# Patient Record
Sex: FEMALE | Race: WHITE | Employment: OTHER | URBAN - METROPOLITAN AREA
[De-identification: names, ages, dates, MRNs, and addresses within clinical notes are randomized per-mention and may not be internally consistent; named-entity substitution may affect disease eponyms.]

---

## 2017-01-03 ENCOUNTER — CARE COORDINATION (OUTPATIENT)
Dept: CARE COORDINATION | Age: 77
End: 2017-01-03

## 2017-01-31 ENCOUNTER — CARE COORDINATION (OUTPATIENT)
Dept: PRIMARY CARE CLINIC | Age: 77
End: 2017-01-31

## 2017-02-08 ENCOUNTER — CARE COORDINATOR VISIT (OUTPATIENT)
Dept: PRIMARY CARE CLINIC | Age: 77
End: 2017-02-08

## 2017-02-08 ENCOUNTER — OFFICE VISIT (OUTPATIENT)
Dept: PRIMARY CARE CLINIC | Age: 77
End: 2017-02-08

## 2017-02-08 VITALS
RESPIRATION RATE: 14 BRPM | HEIGHT: 64 IN | BODY MASS INDEX: 18.78 KG/M2 | DIASTOLIC BLOOD PRESSURE: 84 MMHG | SYSTOLIC BLOOD PRESSURE: 136 MMHG | WEIGHT: 110 LBS | TEMPERATURE: 97.3 F | HEART RATE: 60 BPM

## 2017-02-08 DIAGNOSIS — R63.4 UNINTENDED WEIGHT LOSS: Primary | ICD-10-CM

## 2017-02-08 DIAGNOSIS — C50.919 MALIGNANT NEOPLASM OF FEMALE BREAST, UNSPECIFIED LATERALITY, UNSPECIFIED SITE OF BREAST: ICD-10-CM

## 2017-02-08 DIAGNOSIS — Z72.0 TOBACCO ABUSE: ICD-10-CM

## 2017-02-08 DIAGNOSIS — J44.9 CHRONIC OBSTRUCTIVE PULMONARY DISEASE, UNSPECIFIED COPD TYPE (HCC): ICD-10-CM

## 2017-02-08 DIAGNOSIS — J44.9 OBSTRUCTIVE CHRONIC BRONCHITIS WITHOUT EXACERBATION (HCC): ICD-10-CM

## 2017-02-08 DIAGNOSIS — M89.8X9 BONE PAIN: ICD-10-CM

## 2017-02-08 DIAGNOSIS — C50.911 MALIGNANT NEOPLASM OF RIGHT FEMALE BREAST, UNSPECIFIED SITE OF BREAST: ICD-10-CM

## 2017-02-08 DIAGNOSIS — R51.9 HEADACHE, UNSPECIFIED HEADACHE TYPE: ICD-10-CM

## 2017-02-08 DIAGNOSIS — Z12.31 ENCOUNTER FOR SCREENING MAMMOGRAM FOR BREAST CANCER: ICD-10-CM

## 2017-02-08 LAB
ALBUMIN SERPL-MCNC: 4.5 G/DL (ref 3.9–4.9)
ALP BLD-CCNC: 78 U/L (ref 40–130)
ALT SERPL-CCNC: 25 U/L (ref 0–33)
ANION GAP SERPL CALCULATED.3IONS-SCNC: 11 MEQ/L (ref 7–13)
AST SERPL-CCNC: 27 U/L (ref 0–35)
BASOPHILS ABSOLUTE: 0.1 K/UL (ref 0–0.2)
BASOPHILS RELATIVE PERCENT: 0.9 %
BILIRUB SERPL-MCNC: 0.6 MG/DL (ref 0–1.2)
BUN BLDV-MCNC: 21 MG/DL (ref 8–23)
CALCIUM SERPL-MCNC: 10.4 MG/DL (ref 8.6–10.2)
CHLORIDE BLD-SCNC: 101 MEQ/L (ref 98–107)
CO2: 27 MEQ/L (ref 22–29)
CREAT SERPL-MCNC: 0.65 MG/DL (ref 0.5–0.9)
EOSINOPHILS ABSOLUTE: 0.1 K/UL (ref 0–0.7)
EOSINOPHILS RELATIVE PERCENT: 1.9 %
GFR AFRICAN AMERICAN: >60
GFR NON-AFRICAN AMERICAN: >60
GLOBULIN: 2.5 G/DL (ref 2.3–3.5)
GLUCOSE BLD-MCNC: 91 MG/DL (ref 74–109)
HCT VFR BLD CALC: 45.2 % (ref 37–47)
HEMOGLOBIN: 15.3 G/DL (ref 12–16)
LYMPHOCYTES ABSOLUTE: 2.7 K/UL (ref 1–4.8)
LYMPHOCYTES RELATIVE PERCENT: 34.7 %
MCH RBC QN AUTO: 32.1 PG (ref 27–31.3)
MCHC RBC AUTO-ENTMCNC: 33.9 % (ref 33–37)
MCV RBC AUTO: 94.6 FL (ref 82–100)
MONOCYTES ABSOLUTE: 0.6 K/UL (ref 0.2–0.8)
MONOCYTES RELATIVE PERCENT: 8 %
NEUTROPHILS ABSOLUTE: 4.2 K/UL (ref 1.4–6.5)
NEUTROPHILS RELATIVE PERCENT: 54.5 %
PDW BLD-RTO: 14.5 % (ref 11.5–14.5)
PLATELET # BLD: 172 K/UL (ref 130–400)
POTASSIUM SERPL-SCNC: 4.5 MEQ/L (ref 3.5–5.1)
RBC # BLD: 4.78 M/UL (ref 4.2–5.4)
SODIUM BLD-SCNC: 139 MEQ/L (ref 132–144)
TOTAL PROTEIN: 7 G/DL (ref 6.4–8.1)
WBC # BLD: 7.7 K/UL (ref 4.8–10.8)

## 2017-02-08 PROCEDURE — G8598 ASA/ANTIPLAT THER USED: HCPCS | Performed by: FAMILY MEDICINE

## 2017-02-08 PROCEDURE — G8484 FLU IMMUNIZE NO ADMIN: HCPCS | Performed by: FAMILY MEDICINE

## 2017-02-08 PROCEDURE — 3023F SPIROM DOC REV: CPT | Performed by: FAMILY MEDICINE

## 2017-02-08 PROCEDURE — 1090F PRES/ABSN URINE INCON ASSESS: CPT | Performed by: FAMILY MEDICINE

## 2017-02-08 PROCEDURE — G8427 DOCREV CUR MEDS BY ELIG CLIN: HCPCS | Performed by: FAMILY MEDICINE

## 2017-02-08 PROCEDURE — 99214 OFFICE O/P EST MOD 30 MIN: CPT | Performed by: FAMILY MEDICINE

## 2017-02-08 PROCEDURE — 4004F PT TOBACCO SCREEN RCVD TLK: CPT | Performed by: FAMILY MEDICINE

## 2017-02-08 PROCEDURE — G8400 PT W/DXA NO RESULTS DOC: HCPCS | Performed by: FAMILY MEDICINE

## 2017-02-08 PROCEDURE — 4040F PNEUMOC VAC/ADMIN/RCVD: CPT | Performed by: FAMILY MEDICINE

## 2017-02-08 PROCEDURE — G8926 SPIRO NO PERF OR DOC: HCPCS | Performed by: FAMILY MEDICINE

## 2017-02-08 PROCEDURE — 1123F ACP DISCUSS/DSCN MKR DOCD: CPT | Performed by: FAMILY MEDICINE

## 2017-02-08 PROCEDURE — G8419 CALC BMI OUT NRM PARAM NOF/U: HCPCS | Performed by: FAMILY MEDICINE

## 2017-02-08 ASSESSMENT — ENCOUNTER SYMPTOMS: BACK PAIN: 1

## 2017-02-09 DIAGNOSIS — R60.9 EDEMA, UNSPECIFIED TYPE: Primary | ICD-10-CM

## 2017-02-09 DIAGNOSIS — M79.602 PAIN OF LEFT UPPER EXTREMITY: ICD-10-CM

## 2017-02-09 DIAGNOSIS — R93.89 IRREGULAR RADIOLOGIC DENSITY: ICD-10-CM

## 2017-02-13 DIAGNOSIS — M79.602 PAIN OF LEFT UPPER EXTREMITY: ICD-10-CM

## 2017-02-13 DIAGNOSIS — R60.9 EDEMA, UNSPECIFIED TYPE: ICD-10-CM

## 2017-02-15 ENCOUNTER — CARE COORDINATOR VISIT (OUTPATIENT)
Dept: PRIMARY CARE CLINIC | Age: 77
End: 2017-02-15

## 2017-02-15 ENCOUNTER — OFFICE VISIT (OUTPATIENT)
Dept: PRIMARY CARE CLINIC | Age: 77
End: 2017-02-15

## 2017-02-15 VITALS
TEMPERATURE: 97.2 F | RESPIRATION RATE: 14 BRPM | HEIGHT: 64 IN | BODY MASS INDEX: 18.61 KG/M2 | HEART RATE: 60 BPM | DIASTOLIC BLOOD PRESSURE: 62 MMHG | WEIGHT: 109 LBS | SYSTOLIC BLOOD PRESSURE: 122 MMHG

## 2017-02-15 DIAGNOSIS — C50.919 MALIGNANT NEOPLASM OF FEMALE BREAST, UNSPECIFIED LATERALITY, UNSPECIFIED SITE OF BREAST: ICD-10-CM

## 2017-02-15 DIAGNOSIS — J44.9 CHRONIC OBSTRUCTIVE PULMONARY DISEASE, UNSPECIFIED COPD TYPE (HCC): Primary | ICD-10-CM

## 2017-02-15 DIAGNOSIS — R63.4 WEIGHT LOSS, UNINTENTIONAL: ICD-10-CM

## 2017-02-15 PROCEDURE — G8400 PT W/DXA NO RESULTS DOC: HCPCS | Performed by: FAMILY MEDICINE

## 2017-02-15 PROCEDURE — G8484 FLU IMMUNIZE NO ADMIN: HCPCS | Performed by: FAMILY MEDICINE

## 2017-02-15 PROCEDURE — 99213 OFFICE O/P EST LOW 20 MIN: CPT | Performed by: FAMILY MEDICINE

## 2017-02-15 PROCEDURE — G8427 DOCREV CUR MEDS BY ELIG CLIN: HCPCS | Performed by: FAMILY MEDICINE

## 2017-02-15 PROCEDURE — G8419 CALC BMI OUT NRM PARAM NOF/U: HCPCS | Performed by: FAMILY MEDICINE

## 2017-02-15 PROCEDURE — 3023F SPIROM DOC REV: CPT | Performed by: FAMILY MEDICINE

## 2017-02-15 PROCEDURE — 4004F PT TOBACCO SCREEN RCVD TLK: CPT | Performed by: FAMILY MEDICINE

## 2017-02-15 PROCEDURE — 1123F ACP DISCUSS/DSCN MKR DOCD: CPT | Performed by: FAMILY MEDICINE

## 2017-02-15 PROCEDURE — G8926 SPIRO NO PERF OR DOC: HCPCS | Performed by: FAMILY MEDICINE

## 2017-02-15 PROCEDURE — 1090F PRES/ABSN URINE INCON ASSESS: CPT | Performed by: FAMILY MEDICINE

## 2017-02-15 PROCEDURE — G8598 ASA/ANTIPLAT THER USED: HCPCS | Performed by: FAMILY MEDICINE

## 2017-02-15 PROCEDURE — 4040F PNEUMOC VAC/ADMIN/RCVD: CPT | Performed by: FAMILY MEDICINE

## 2017-02-15 ASSESSMENT — ENCOUNTER SYMPTOMS
CHOKING: 0
APNEA: 0
STRIDOR: 0
DIARRHEA: 0
EYE PAIN: 0
ABDOMINAL PAIN: 1
EYE DISCHARGE: 0
NAUSEA: 0
CHEST TIGHTNESS: 0
CONSTIPATION: 0
EYE REDNESS: 0
WHEEZING: 0
FACIAL SWELLING: 0
COLOR CHANGE: 0
PHOTOPHOBIA: 0

## 2017-03-08 ENCOUNTER — OFFICE VISIT (OUTPATIENT)
Dept: PRIMARY CARE CLINIC | Age: 77
End: 2017-03-08

## 2017-03-08 VITALS
HEART RATE: 64 BPM | RESPIRATION RATE: 12 BRPM | SYSTOLIC BLOOD PRESSURE: 104 MMHG | HEIGHT: 64 IN | BODY MASS INDEX: 18.82 KG/M2 | DIASTOLIC BLOOD PRESSURE: 60 MMHG | WEIGHT: 110.2 LBS | TEMPERATURE: 97 F

## 2017-03-08 DIAGNOSIS — R63.0 LOSS OF APPETITE: ICD-10-CM

## 2017-03-08 DIAGNOSIS — Z72.0 TOBACCO ABUSE: ICD-10-CM

## 2017-03-08 DIAGNOSIS — C50.919 MALIGNANT NEOPLASM OF FEMALE BREAST, UNSPECIFIED LATERALITY, UNSPECIFIED SITE OF BREAST: ICD-10-CM

## 2017-03-08 DIAGNOSIS — R63.4 UNINTENDED WEIGHT LOSS: Primary | ICD-10-CM

## 2017-03-08 PROCEDURE — 4040F PNEUMOC VAC/ADMIN/RCVD: CPT | Performed by: FAMILY MEDICINE

## 2017-03-08 PROCEDURE — G0444 DEPRESSION SCREEN ANNUAL: HCPCS | Performed by: FAMILY MEDICINE

## 2017-03-08 PROCEDURE — 99213 OFFICE O/P EST LOW 20 MIN: CPT | Performed by: FAMILY MEDICINE

## 2017-03-08 PROCEDURE — G8484 FLU IMMUNIZE NO ADMIN: HCPCS | Performed by: FAMILY MEDICINE

## 2017-03-08 PROCEDURE — G8400 PT W/DXA NO RESULTS DOC: HCPCS | Performed by: FAMILY MEDICINE

## 2017-03-08 PROCEDURE — G8598 ASA/ANTIPLAT THER USED: HCPCS | Performed by: FAMILY MEDICINE

## 2017-03-08 PROCEDURE — 1090F PRES/ABSN URINE INCON ASSESS: CPT | Performed by: FAMILY MEDICINE

## 2017-03-08 PROCEDURE — 4004F PT TOBACCO SCREEN RCVD TLK: CPT | Performed by: FAMILY MEDICINE

## 2017-03-08 PROCEDURE — 1123F ACP DISCUSS/DSCN MKR DOCD: CPT | Performed by: FAMILY MEDICINE

## 2017-03-08 PROCEDURE — G8419 CALC BMI OUT NRM PARAM NOF/U: HCPCS | Performed by: FAMILY MEDICINE

## 2017-03-08 PROCEDURE — G8427 DOCREV CUR MEDS BY ELIG CLIN: HCPCS | Performed by: FAMILY MEDICINE

## 2017-03-08 RX ORDER — MEGESTROL ACETATE 40 MG/1
40 TABLET ORAL DAILY
Qty: 30 TABLET | Refills: 3 | Status: SHIPPED | OUTPATIENT
Start: 2017-03-08 | End: 2017-07-13

## 2017-03-08 ASSESSMENT — PATIENT HEALTH QUESTIONNAIRE - PHQ9
SUM OF ALL RESPONSES TO PHQ9 QUESTIONS 1 & 2: 5
3. TROUBLE FALLING OR STAYING ASLEEP: 3
6. FEELING BAD ABOUT YOURSELF - OR THAT YOU ARE A FAILURE OR HAVE LET YOURSELF OR YOUR FAMILY DOWN: 0
7. TROUBLE CONCENTRATING ON THINGS, SUCH AS READING THE NEWSPAPER OR WATCHING TELEVISION: 0
8. MOVING OR SPEAKING SO SLOWLY THAT OTHER PEOPLE COULD HAVE NOTICED. OR THE OPPOSITE, BEING SO FIGETY OR RESTLESS THAT YOU HAVE BEEN MOVING AROUND A LOT MORE THAN USUAL: 0
1. LITTLE INTEREST OR PLEASURE IN DOING THINGS: 3
9. THOUGHTS THAT YOU WOULD BE BETTER OFF DEAD, OR OF HURTING YOURSELF: 0
5. POOR APPETITE OR OVEREATING: 1
10. IF YOU CHECKED OFF ANY PROBLEMS, HOW DIFFICULT HAVE THESE PROBLEMS MADE IT FOR YOU TO DO YOUR WORK, TAKE CARE OF THINGS AT HOME, OR GET ALONG WITH OTHER PEOPLE: 0
SUM OF ALL RESPONSES TO PHQ QUESTIONS 1-9: 12
4. FEELING TIRED OR HAVING LITTLE ENERGY: 3
2. FEELING DOWN, DEPRESSED OR HOPELESS: 2

## 2017-03-08 ASSESSMENT — ENCOUNTER SYMPTOMS: ABDOMINAL PAIN: 0

## 2017-03-15 ENCOUNTER — CARE COORDINATION (OUTPATIENT)
Dept: CARE COORDINATION | Age: 77
End: 2017-03-15

## 2017-04-20 ENCOUNTER — CARE COORDINATION (OUTPATIENT)
Dept: PRIMARY CARE CLINIC | Age: 77
End: 2017-04-20

## 2017-04-20 ENCOUNTER — TELEPHONE (OUTPATIENT)
Dept: PRIMARY CARE CLINIC | Age: 77
End: 2017-04-20

## 2017-04-20 DIAGNOSIS — E78.5 HYPERLIPIDEMIA, UNSPECIFIED HYPERLIPIDEMIA TYPE: ICD-10-CM

## 2017-04-20 DIAGNOSIS — I63.342 CEREBROVASCULAR ACCIDENT (CVA) DUE TO THROMBOSIS OF LEFT CEREBELLAR ARTERY (HCC): Primary | ICD-10-CM

## 2017-05-05 ENCOUNTER — TELEPHONE (OUTPATIENT)
Dept: PRIMARY CARE CLINIC | Age: 77
End: 2017-05-05

## 2017-05-19 ENCOUNTER — CARE COORDINATION (OUTPATIENT)
Dept: PRIMARY CARE CLINIC | Age: 77
End: 2017-05-19

## 2017-05-19 ASSESSMENT — ENCOUNTER SYMPTOMS: DYSPNEA ASSOCIATED WITH: EXERTION

## 2017-05-27 RX ORDER — HYDROCHLOROTHIAZIDE 12.5 MG/1
TABLET ORAL
Qty: 30 TABLET | Refills: 0 | Status: SHIPPED | OUTPATIENT
Start: 2017-05-27 | End: 2017-10-26 | Stop reason: SDUPTHER

## 2017-06-19 ENCOUNTER — CARE COORDINATION (OUTPATIENT)
Dept: CARE COORDINATION | Age: 77
End: 2017-06-19

## 2017-06-20 ASSESSMENT — ENCOUNTER SYMPTOMS: DYSPNEA ASSOCIATED WITH: EXERTION

## 2017-07-03 ENCOUNTER — CARE COORDINATION (OUTPATIENT)
Dept: CARE COORDINATION | Age: 77
End: 2017-07-03

## 2017-07-03 ASSESSMENT — ENCOUNTER SYMPTOMS: DYSPNEA ASSOCIATED WITH: EXERTION

## 2017-07-13 ENCOUNTER — OFFICE VISIT (OUTPATIENT)
Dept: PRIMARY CARE CLINIC | Age: 77
End: 2017-07-13

## 2017-07-13 VITALS
HEART RATE: 67 BPM | BODY MASS INDEX: 18.1 KG/M2 | WEIGHT: 106 LBS | SYSTOLIC BLOOD PRESSURE: 100 MMHG | TEMPERATURE: 97.9 F | HEIGHT: 64 IN | DIASTOLIC BLOOD PRESSURE: 70 MMHG | OXYGEN SATURATION: 99 % | RESPIRATION RATE: 17 BRPM

## 2017-07-13 DIAGNOSIS — J44.9 CHRONIC OBSTRUCTIVE PULMONARY DISEASE, UNSPECIFIED COPD TYPE (HCC): ICD-10-CM

## 2017-07-13 DIAGNOSIS — H35.30 MACULAR DEGENERATION: ICD-10-CM

## 2017-07-13 DIAGNOSIS — C50.919 MALIGNANT NEOPLASM OF FEMALE BREAST, UNSPECIFIED LATERALITY, UNSPECIFIED SITE OF BREAST: ICD-10-CM

## 2017-07-13 DIAGNOSIS — R59.0 LYMPHADENOPATHY, INGUINAL: Primary | ICD-10-CM

## 2017-07-13 DIAGNOSIS — R63.4 ABNORMAL WEIGHT LOSS: ICD-10-CM

## 2017-07-13 DIAGNOSIS — Z13.31 POSITIVE DEPRESSION SCREENING: ICD-10-CM

## 2017-07-13 PROCEDURE — 4004F PT TOBACCO SCREEN RCVD TLK: CPT | Performed by: FAMILY MEDICINE

## 2017-07-13 PROCEDURE — G8400 PT W/DXA NO RESULTS DOC: HCPCS | Performed by: FAMILY MEDICINE

## 2017-07-13 PROCEDURE — 1123F ACP DISCUSS/DSCN MKR DOCD: CPT | Performed by: FAMILY MEDICINE

## 2017-07-13 PROCEDURE — G8427 DOCREV CUR MEDS BY ELIG CLIN: HCPCS | Performed by: FAMILY MEDICINE

## 2017-07-13 PROCEDURE — 3023F SPIROM DOC REV: CPT | Performed by: FAMILY MEDICINE

## 2017-07-13 PROCEDURE — G8419 CALC BMI OUT NRM PARAM NOF/U: HCPCS | Performed by: FAMILY MEDICINE

## 2017-07-13 PROCEDURE — G8598 ASA/ANTIPLAT THER USED: HCPCS | Performed by: FAMILY MEDICINE

## 2017-07-13 PROCEDURE — 99214 OFFICE O/P EST MOD 30 MIN: CPT | Performed by: FAMILY MEDICINE

## 2017-07-13 PROCEDURE — 1090F PRES/ABSN URINE INCON ASSESS: CPT | Performed by: FAMILY MEDICINE

## 2017-07-13 PROCEDURE — 4040F PNEUMOC VAC/ADMIN/RCVD: CPT | Performed by: FAMILY MEDICINE

## 2017-07-13 PROCEDURE — G8431 POS CLIN DEPRES SCRN F/U DOC: HCPCS | Performed by: FAMILY MEDICINE

## 2017-07-13 PROCEDURE — G8926 SPIRO NO PERF OR DOC: HCPCS | Performed by: FAMILY MEDICINE

## 2017-07-13 RX ORDER — GLUCOSAM/CHONDRO/HERB 149/HYAL 750-100 MG
1 TABLET ORAL DAILY
Qty: 30 TABLET | Refills: 0 | COMMUNITY
Start: 2017-07-13

## 2017-07-13 ASSESSMENT — ENCOUNTER SYMPTOMS
ABDOMINAL DISTENTION: 0
EYE DISCHARGE: 0
EYE PAIN: 0
CHEST TIGHTNESS: 0
VOMITING: 0
DIARRHEA: 0
ALLERGIC/IMMUNOLOGIC NEGATIVE: 1
RESPIRATORY NEGATIVE: 1
EYES NEGATIVE: 1
SHORTNESS OF BREATH: 0
GASTROINTESTINAL NEGATIVE: 1

## 2017-08-01 ENCOUNTER — CARE COORDINATOR VISIT (OUTPATIENT)
Dept: CARE COORDINATION | Age: 77
End: 2017-08-01

## 2017-08-01 ENCOUNTER — OFFICE VISIT (OUTPATIENT)
Dept: PRIMARY CARE CLINIC | Age: 77
End: 2017-08-01

## 2017-08-01 VITALS
TEMPERATURE: 98.1 F | HEIGHT: 64 IN | WEIGHT: 107 LBS | DIASTOLIC BLOOD PRESSURE: 82 MMHG | SYSTOLIC BLOOD PRESSURE: 130 MMHG | BODY MASS INDEX: 18.27 KG/M2 | RESPIRATION RATE: 12 BRPM | HEART RATE: 64 BPM

## 2017-08-01 DIAGNOSIS — R59.1 LYMPHADENOPATHY: ICD-10-CM

## 2017-08-01 DIAGNOSIS — I63.342 CEREBROVASCULAR ACCIDENT (CVA) DUE TO THROMBOSIS OF LEFT CEREBELLAR ARTERY (HCC): ICD-10-CM

## 2017-08-01 DIAGNOSIS — R31.9 URINARY TRACT INFECTION WITH HEMATURIA, SITE UNSPECIFIED: ICD-10-CM

## 2017-08-01 DIAGNOSIS — N39.0 URINARY TRACT INFECTION WITH HEMATURIA, SITE UNSPECIFIED: ICD-10-CM

## 2017-08-01 DIAGNOSIS — C50.919 MALIGNANT NEOPLASM OF FEMALE BREAST, UNSPECIFIED LATERALITY, UNSPECIFIED SITE OF BREAST: ICD-10-CM

## 2017-08-01 DIAGNOSIS — T46.4X1S: ICD-10-CM

## 2017-08-01 DIAGNOSIS — M19.90 OSTEOARTHRITIS, UNSPECIFIED OSTEOARTHRITIS TYPE, UNSPECIFIED SITE: ICD-10-CM

## 2017-08-01 DIAGNOSIS — Z01.818 PRE-OPERATIVE CLEARANCE: Primary | ICD-10-CM

## 2017-08-01 DIAGNOSIS — Z01.818 PRE-OPERATIVE CLEARANCE: ICD-10-CM

## 2017-08-01 DIAGNOSIS — J44.9 CHRONIC OBSTRUCTIVE PULMONARY DISEASE, UNSPECIFIED COPD TYPE (HCC): ICD-10-CM

## 2017-08-01 DIAGNOSIS — T78.3XXS: ICD-10-CM

## 2017-08-01 DIAGNOSIS — I49.1 PAC (PREMATURE ATRIAL CONTRACTION): ICD-10-CM

## 2017-08-01 LAB
ALBUMIN SERPL-MCNC: 4.5 G/DL (ref 3.9–4.9)
ALP BLD-CCNC: 81 U/L (ref 40–130)
ALT SERPL-CCNC: 26 U/L (ref 0–33)
ANION GAP SERPL CALCULATED.3IONS-SCNC: 17 MEQ/L (ref 7–13)
APTT: 27.8 SEC (ref 21.6–35.4)
AST SERPL-CCNC: 29 U/L (ref 0–35)
BILIRUB SERPL-MCNC: 0.5 MG/DL (ref 0–1.2)
BILIRUBIN, POC: ABNORMAL
BLOOD URINE, POC: ABNORMAL
BUN BLDV-MCNC: 23 MG/DL (ref 8–23)
CALCIUM SERPL-MCNC: 10.6 MG/DL (ref 8.6–10.2)
CHLORIDE BLD-SCNC: 99 MEQ/L (ref 98–107)
CLARITY, POC: CLEAR
CO2: 25 MEQ/L (ref 22–29)
COLOR, POC: YELLOW
CREAT SERPL-MCNC: 0.75 MG/DL (ref 0.5–0.9)
GFR AFRICAN AMERICAN: >60
GFR NON-AFRICAN AMERICAN: >60
GLOBULIN: 2.7 G/DL (ref 2.3–3.5)
GLUCOSE BLD-MCNC: 88 MG/DL (ref 74–109)
GLUCOSE URINE, POC: ABNORMAL
HCT VFR BLD CALC: 46.4 % (ref 37–47)
HEMOGLOBIN: 15.4 G/DL (ref 12–16)
INR BLD: 1
KETONES, POC: ABNORMAL
LEUKOCYTE EST, POC: ABNORMAL
MCH RBC QN AUTO: 31 PG (ref 27–31.3)
MCHC RBC AUTO-ENTMCNC: 33.1 % (ref 33–37)
MCV RBC AUTO: 93.5 FL (ref 82–100)
NITRITE, POC: ABNORMAL
PDW BLD-RTO: 15.2 % (ref 11.5–14.5)
PH, POC: 6.5
PLATELET # BLD: 174 K/UL (ref 130–400)
POTASSIUM SERPL-SCNC: 4.4 MEQ/L (ref 3.5–5.1)
PROTEIN, POC: ABNORMAL
PROTHROMBIN TIME: 10.6 SEC (ref 8.1–13.7)
RBC # BLD: 4.97 M/UL (ref 4.2–5.4)
SODIUM BLD-SCNC: 141 MEQ/L (ref 132–144)
SPECIFIC GRAVITY, POC: 1.02
TOTAL PROTEIN: 7.2 G/DL (ref 6.4–8.1)
UROBILINOGEN, POC: ABNORMAL
WBC # BLD: 7.5 K/UL (ref 4.8–10.8)

## 2017-08-01 PROCEDURE — 93000 ELECTROCARDIOGRAM COMPLETE: CPT | Performed by: FAMILY MEDICINE

## 2017-08-01 PROCEDURE — G8400 PT W/DXA NO RESULTS DOC: HCPCS | Performed by: FAMILY MEDICINE

## 2017-08-01 PROCEDURE — 4040F PNEUMOC VAC/ADMIN/RCVD: CPT | Performed by: FAMILY MEDICINE

## 2017-08-01 PROCEDURE — 81003 URINALYSIS AUTO W/O SCOPE: CPT | Performed by: FAMILY MEDICINE

## 2017-08-01 PROCEDURE — 4004F PT TOBACCO SCREEN RCVD TLK: CPT | Performed by: FAMILY MEDICINE

## 2017-08-01 PROCEDURE — G8926 SPIRO NO PERF OR DOC: HCPCS | Performed by: FAMILY MEDICINE

## 2017-08-01 PROCEDURE — G8419 CALC BMI OUT NRM PARAM NOF/U: HCPCS | Performed by: FAMILY MEDICINE

## 2017-08-01 PROCEDURE — 3023F SPIROM DOC REV: CPT | Performed by: FAMILY MEDICINE

## 2017-08-01 PROCEDURE — 1123F ACP DISCUSS/DSCN MKR DOCD: CPT | Performed by: FAMILY MEDICINE

## 2017-08-01 PROCEDURE — 99214 OFFICE O/P EST MOD 30 MIN: CPT | Performed by: FAMILY MEDICINE

## 2017-08-01 PROCEDURE — G8598 ASA/ANTIPLAT THER USED: HCPCS | Performed by: FAMILY MEDICINE

## 2017-08-01 PROCEDURE — G8427 DOCREV CUR MEDS BY ELIG CLIN: HCPCS | Performed by: FAMILY MEDICINE

## 2017-08-01 PROCEDURE — 1090F PRES/ABSN URINE INCON ASSESS: CPT | Performed by: FAMILY MEDICINE

## 2017-08-01 RX ORDER — CIPROFLOXACIN 250 MG/1
250 TABLET, FILM COATED ORAL 2 TIMES DAILY
Qty: 14 TABLET | Refills: 0 | Status: SHIPPED | OUTPATIENT
Start: 2017-08-01 | End: 2017-08-08

## 2017-08-01 ASSESSMENT — ENCOUNTER SYMPTOMS
EYE PAIN: 0
PHOTOPHOBIA: 0
CHEST TIGHTNESS: 0
WHEEZING: 0
NAUSEA: 0
STRIDOR: 0
DYSPNEA ASSOCIATED WITH: EXERTION
EYE REDNESS: 0
COLOR CHANGE: 0
FACIAL SWELLING: 0
EYE DISCHARGE: 0
DIARRHEA: 0
APNEA: 0
CONSTIPATION: 0
CHOKING: 0
ABDOMINAL PAIN: 0

## 2017-08-03 LAB — URINE CULTURE, ROUTINE: NORMAL

## 2017-08-07 ENCOUNTER — TELEPHONE (OUTPATIENT)
Dept: PRIMARY CARE CLINIC | Age: 77
End: 2017-08-07

## 2017-08-09 ENCOUNTER — TELEPHONE (OUTPATIENT)
Dept: PRIMARY CARE CLINIC | Age: 77
End: 2017-08-09

## 2017-08-16 ENCOUNTER — CARE COORDINATION (OUTPATIENT)
Dept: CARE COORDINATION | Age: 77
End: 2017-08-16

## 2017-08-16 ASSESSMENT — ENCOUNTER SYMPTOMS: DYSPNEA ASSOCIATED WITH: EXERTION

## 2017-08-29 ENCOUNTER — OFFICE VISIT (OUTPATIENT)
Dept: PRIMARY CARE CLINIC | Age: 77
End: 2017-08-29

## 2017-08-29 ENCOUNTER — CARE COORDINATOR VISIT (OUTPATIENT)
Dept: CARE COORDINATION | Age: 77
End: 2017-08-29

## 2017-08-29 VITALS
SYSTOLIC BLOOD PRESSURE: 135 MMHG | DIASTOLIC BLOOD PRESSURE: 86 MMHG | TEMPERATURE: 97.3 F | BODY MASS INDEX: 18.1 KG/M2 | HEIGHT: 64 IN | WEIGHT: 106 LBS | HEART RATE: 80 BPM | RESPIRATION RATE: 20 BRPM

## 2017-08-29 DIAGNOSIS — R51.9 HEADACHE, UNSPECIFIED HEADACHE TYPE: ICD-10-CM

## 2017-08-29 DIAGNOSIS — I49.1 PAC (PREMATURE ATRIAL CONTRACTION): ICD-10-CM

## 2017-08-29 DIAGNOSIS — R06.00 DYSPNEA, UNSPECIFIED TYPE: ICD-10-CM

## 2017-08-29 DIAGNOSIS — R59.1 LYMPHADENOPATHY: Primary | ICD-10-CM

## 2017-08-29 DIAGNOSIS — I48.0 PAF (PAROXYSMAL ATRIAL FIBRILLATION) (HCC): ICD-10-CM

## 2017-08-29 DIAGNOSIS — I49.9 IRREGULAR HEART BEAT: ICD-10-CM

## 2017-08-29 PROCEDURE — 4040F PNEUMOC VAC/ADMIN/RCVD: CPT | Performed by: FAMILY MEDICINE

## 2017-08-29 PROCEDURE — G8598 ASA/ANTIPLAT THER USED: HCPCS | Performed by: FAMILY MEDICINE

## 2017-08-29 PROCEDURE — 1123F ACP DISCUSS/DSCN MKR DOCD: CPT | Performed by: FAMILY MEDICINE

## 2017-08-29 PROCEDURE — G8419 CALC BMI OUT NRM PARAM NOF/U: HCPCS | Performed by: FAMILY MEDICINE

## 2017-08-29 PROCEDURE — G8400 PT W/DXA NO RESULTS DOC: HCPCS | Performed by: FAMILY MEDICINE

## 2017-08-29 PROCEDURE — 4004F PT TOBACCO SCREEN RCVD TLK: CPT | Performed by: FAMILY MEDICINE

## 2017-08-29 PROCEDURE — 99214 OFFICE O/P EST MOD 30 MIN: CPT | Performed by: FAMILY MEDICINE

## 2017-08-29 PROCEDURE — 1090F PRES/ABSN URINE INCON ASSESS: CPT | Performed by: FAMILY MEDICINE

## 2017-08-29 PROCEDURE — G8427 DOCREV CUR MEDS BY ELIG CLIN: HCPCS | Performed by: FAMILY MEDICINE

## 2017-08-29 RX ORDER — ACETAMINOPHEN, ASPIRIN AND CAFFEINE 250; 250; 65 MG/1; MG/1; MG/1
1 TABLET, FILM COATED ORAL 2 TIMES DAILY PRN
COMMUNITY
End: 2018-05-08

## 2017-08-29 ASSESSMENT — ENCOUNTER SYMPTOMS
COLOR CHANGE: 0
DIARRHEA: 0
EYE PAIN: 0
EYE REDNESS: 0
STRIDOR: 0
WHEEZING: 0
CHEST TIGHTNESS: 0
NAUSEA: 0
PHOTOPHOBIA: 0
FACIAL SWELLING: 0
EYE DISCHARGE: 0
CONSTIPATION: 0
ABDOMINAL PAIN: 0
APNEA: 0
CHOKING: 0
SHORTNESS OF BREATH: 1

## 2017-09-25 PROBLEM — I48.0 PAF (PAROXYSMAL ATRIAL FIBRILLATION) (HCC): Status: ACTIVE | Noted: 2017-08-01

## 2017-09-26 ENCOUNTER — CARE COORDINATION (OUTPATIENT)
Dept: PRIMARY CARE CLINIC | Age: 77
End: 2017-09-26

## 2017-10-12 RX ORDER — ATORVASTATIN CALCIUM 10 MG/1
10 TABLET, FILM COATED ORAL DAILY
Qty: 90 TABLET | Refills: 1 | Status: SHIPPED | OUTPATIENT
Start: 2017-10-12 | End: 2018-05-08 | Stop reason: SDUPTHER

## 2017-10-26 RX ORDER — HYDROCHLOROTHIAZIDE 12.5 MG/1
TABLET ORAL
Qty: 30 TABLET | Refills: 2 | Status: SHIPPED | OUTPATIENT
Start: 2017-10-26 | End: 2018-05-08 | Stop reason: SDUPTHER

## 2017-10-26 RX ORDER — ATORVASTATIN CALCIUM 10 MG/1
10 TABLET, FILM COATED ORAL DAILY
Qty: 90 TABLET | Refills: 1 | OUTPATIENT
Start: 2017-10-26

## 2017-10-26 RX ORDER — CLOPIDOGREL BISULFATE 75 MG/1
75 TABLET ORAL DAILY
Qty: 90 TABLET | Refills: 1 | Status: SHIPPED | OUTPATIENT
Start: 2017-10-26 | End: 2018-05-08 | Stop reason: SDUPTHER

## 2018-05-04 RX ORDER — CLOPIDOGREL BISULFATE 75 MG/1
75 TABLET ORAL DAILY
Qty: 90 TABLET | Refills: 1 | OUTPATIENT
Start: 2018-05-04

## 2018-05-04 RX ORDER — ATORVASTATIN CALCIUM 10 MG/1
10 TABLET, FILM COATED ORAL DAILY
Qty: 90 TABLET | Refills: 1 | OUTPATIENT
Start: 2018-05-04

## 2018-05-08 ENCOUNTER — OFFICE VISIT (OUTPATIENT)
Dept: PRIMARY CARE CLINIC | Age: 78
End: 2018-05-08
Payer: MEDICARE

## 2018-05-08 VITALS
BODY MASS INDEX: 17.58 KG/M2 | RESPIRATION RATE: 12 BRPM | HEIGHT: 64 IN | WEIGHT: 103 LBS | DIASTOLIC BLOOD PRESSURE: 90 MMHG | SYSTOLIC BLOOD PRESSURE: 150 MMHG | TEMPERATURE: 97.9 F | HEART RATE: 56 BPM

## 2018-05-08 DIAGNOSIS — R53.83 OTHER FATIGUE: ICD-10-CM

## 2018-05-08 DIAGNOSIS — R19.00 ABDOMINAL PULSATILE MASS: ICD-10-CM

## 2018-05-08 DIAGNOSIS — H69.82 ETD (EUSTACHIAN TUBE DYSFUNCTION), LEFT: ICD-10-CM

## 2018-05-08 DIAGNOSIS — I63.342 CEREBROVASCULAR ACCIDENT (CVA) DUE TO THROMBOSIS OF LEFT CEREBELLAR ARTERY (HCC): ICD-10-CM

## 2018-05-08 DIAGNOSIS — J44.9 CHRONIC OBSTRUCTIVE PULMONARY DISEASE, UNSPECIFIED COPD TYPE (HCC): ICD-10-CM

## 2018-05-08 DIAGNOSIS — E78.5 DYSLIPIDEMIA: ICD-10-CM

## 2018-05-08 DIAGNOSIS — E78.5 DYSLIPIDEMIA: Primary | ICD-10-CM

## 2018-05-08 DIAGNOSIS — I48.0 PAF (PAROXYSMAL ATRIAL FIBRILLATION) (HCC): ICD-10-CM

## 2018-05-08 LAB
ALBUMIN SERPL-MCNC: 4.5 G/DL (ref 3.9–4.9)
ALP BLD-CCNC: 84 U/L (ref 40–130)
ALT SERPL-CCNC: 23 U/L (ref 0–33)
ANION GAP SERPL CALCULATED.3IONS-SCNC: 15 MEQ/L (ref 7–13)
AST SERPL-CCNC: 28 U/L (ref 0–35)
BASOPHILS ABSOLUTE: 0.1 K/UL (ref 0–0.2)
BASOPHILS RELATIVE PERCENT: 1 %
BILIRUB SERPL-MCNC: 0.4 MG/DL (ref 0–1.2)
BUN BLDV-MCNC: 21 MG/DL (ref 8–23)
CALCIUM SERPL-MCNC: 10.1 MG/DL (ref 8.6–10.2)
CHLORIDE BLD-SCNC: 102 MEQ/L (ref 98–107)
CHOLESTEROL, TOTAL: 190 MG/DL (ref 0–199)
CO2: 25 MEQ/L (ref 22–29)
CREAT SERPL-MCNC: 0.74 MG/DL (ref 0.5–0.9)
EOSINOPHILS ABSOLUTE: 0.1 K/UL (ref 0–0.7)
EOSINOPHILS RELATIVE PERCENT: 0.8 %
GFR AFRICAN AMERICAN: >60
GFR NON-AFRICAN AMERICAN: >60
GLOBULIN: 2.5 G/DL (ref 2.3–3.5)
GLUCOSE BLD-MCNC: 96 MG/DL (ref 74–109)
HCT VFR BLD CALC: 44.8 % (ref 37–47)
HDLC SERPL-MCNC: 76 MG/DL (ref 40–59)
HEMOGLOBIN: 15.1 G/DL (ref 12–16)
LDL CHOLESTEROL CALCULATED: 102 MG/DL (ref 0–129)
LYMPHOCYTES ABSOLUTE: 2.1 K/UL (ref 1–4.8)
LYMPHOCYTES RELATIVE PERCENT: 27.6 %
MCH RBC QN AUTO: 32.1 PG (ref 27–31.3)
MCHC RBC AUTO-ENTMCNC: 33.6 % (ref 33–37)
MCV RBC AUTO: 95.4 FL (ref 82–100)
MONOCYTES ABSOLUTE: 0.5 K/UL (ref 0.2–0.8)
MONOCYTES RELATIVE PERCENT: 5.9 %
NEUTROPHILS ABSOLUTE: 4.9 K/UL (ref 1.4–6.5)
NEUTROPHILS RELATIVE PERCENT: 64.7 %
PDW BLD-RTO: 14.3 % (ref 11.5–14.5)
PLATELET # BLD: 195 K/UL (ref 130–400)
POTASSIUM SERPL-SCNC: 4.7 MEQ/L (ref 3.5–5.1)
RBC # BLD: 4.7 M/UL (ref 4.2–5.4)
SODIUM BLD-SCNC: 142 MEQ/L (ref 132–144)
TOTAL PROTEIN: 7 G/DL (ref 6.4–8.1)
TRIGL SERPL-MCNC: 62 MG/DL (ref 0–200)
TSH SERPL DL<=0.05 MIU/L-ACNC: 1.54 UIU/ML (ref 0.27–4.2)
WBC # BLD: 7.6 K/UL (ref 4.8–10.8)

## 2018-05-08 PROCEDURE — G8926 SPIRO NO PERF OR DOC: HCPCS | Performed by: FAMILY MEDICINE

## 2018-05-08 PROCEDURE — G8400 PT W/DXA NO RESULTS DOC: HCPCS | Performed by: FAMILY MEDICINE

## 2018-05-08 PROCEDURE — 99214 OFFICE O/P EST MOD 30 MIN: CPT | Performed by: FAMILY MEDICINE

## 2018-05-08 PROCEDURE — G8418 CALC BMI BLW LOW PARAM F/U: HCPCS | Performed by: FAMILY MEDICINE

## 2018-05-08 PROCEDURE — 4040F PNEUMOC VAC/ADMIN/RCVD: CPT | Performed by: FAMILY MEDICINE

## 2018-05-08 PROCEDURE — G8598 ASA/ANTIPLAT THER USED: HCPCS | Performed by: FAMILY MEDICINE

## 2018-05-08 PROCEDURE — 1123F ACP DISCUSS/DSCN MKR DOCD: CPT | Performed by: FAMILY MEDICINE

## 2018-05-08 PROCEDURE — G8427 DOCREV CUR MEDS BY ELIG CLIN: HCPCS | Performed by: FAMILY MEDICINE

## 2018-05-08 PROCEDURE — 3023F SPIROM DOC REV: CPT | Performed by: FAMILY MEDICINE

## 2018-05-08 PROCEDURE — 4004F PT TOBACCO SCREEN RCVD TLK: CPT | Performed by: FAMILY MEDICINE

## 2018-05-08 PROCEDURE — 1090F PRES/ABSN URINE INCON ASSESS: CPT | Performed by: FAMILY MEDICINE

## 2018-05-08 RX ORDER — ATORVASTATIN CALCIUM 10 MG/1
10 TABLET, FILM COATED ORAL DAILY
Qty: 90 TABLET | Refills: 1 | Status: SHIPPED | OUTPATIENT
Start: 2018-05-08 | End: 2018-11-19 | Stop reason: SDUPTHER

## 2018-05-08 RX ORDER — CLOPIDOGREL BISULFATE 75 MG/1
75 TABLET ORAL DAILY
Qty: 90 TABLET | Refills: 1 | Status: SHIPPED | OUTPATIENT
Start: 2018-05-08 | End: 2018-11-19 | Stop reason: SDUPTHER

## 2018-05-08 RX ORDER — HYDROCHLOROTHIAZIDE 12.5 MG/1
TABLET ORAL
Qty: 90 TABLET | Refills: 1 | Status: SHIPPED | OUTPATIENT
Start: 2018-05-08 | End: 2019-01-21 | Stop reason: SDUPTHER

## 2018-05-08 ASSESSMENT — ENCOUNTER SYMPTOMS
CHOKING: 0
WHEEZING: 0
SHORTNESS OF BREATH: 0
CONSTIPATION: 0
APNEA: 0
PHOTOPHOBIA: 0
STRIDOR: 0
DIARRHEA: 0
FACIAL SWELLING: 0
EYE PAIN: 0
NAUSEA: 0
EYE DISCHARGE: 0
COLOR CHANGE: 0
CHEST TIGHTNESS: 0
ABDOMINAL PAIN: 0
EYE REDNESS: 0

## 2018-05-08 ASSESSMENT — PATIENT HEALTH QUESTIONNAIRE - PHQ9
1. LITTLE INTEREST OR PLEASURE IN DOING THINGS: 0
SUM OF ALL RESPONSES TO PHQ9 QUESTIONS 1 & 2: 0
SUM OF ALL RESPONSES TO PHQ QUESTIONS 1-9: 0
2. FEELING DOWN, DEPRESSED OR HOPELESS: 0

## 2018-05-09 ENCOUNTER — TELEPHONE (OUTPATIENT)
Dept: PRIMARY CARE CLINIC | Age: 78
End: 2018-05-09

## 2018-05-10 RX ORDER — CIPROFLOXACIN AND DEXAMETHASONE 3; 1 MG/ML; MG/ML
4 SUSPENSION/ DROPS AURICULAR (OTIC) 2 TIMES DAILY
Qty: 1 BOTTLE | Refills: 1 | Status: SHIPPED | OUTPATIENT
Start: 2018-05-10 | End: 2018-05-17

## 2018-05-16 ENCOUNTER — TELEPHONE (OUTPATIENT)
Dept: PRIMARY CARE CLINIC | Age: 78
End: 2018-05-16

## 2018-05-16 DIAGNOSIS — I71.40 ABDOMINAL AORTIC ANEURYSM WITHOUT RUPTURE: Primary | ICD-10-CM

## 2018-05-17 ENCOUNTER — TELEPHONE (OUTPATIENT)
Dept: PRIMARY CARE CLINIC | Age: 78
End: 2018-05-17

## 2018-05-30 ENCOUNTER — OFFICE VISIT (OUTPATIENT)
Dept: PRIMARY CARE CLINIC | Age: 78
End: 2018-05-30
Payer: MEDICARE

## 2018-05-30 VITALS
BODY MASS INDEX: 17.42 KG/M2 | TEMPERATURE: 98 F | SYSTOLIC BLOOD PRESSURE: 110 MMHG | HEIGHT: 64 IN | RESPIRATION RATE: 14 BRPM | DIASTOLIC BLOOD PRESSURE: 80 MMHG | HEART RATE: 56 BPM | OXYGEN SATURATION: 97 % | WEIGHT: 102 LBS

## 2018-05-30 DIAGNOSIS — E78.5 DYSLIPIDEMIA: ICD-10-CM

## 2018-05-30 DIAGNOSIS — S29.012A STRAIN OF RHOMBOID MUSCLE, INITIAL ENCOUNTER: ICD-10-CM

## 2018-05-30 DIAGNOSIS — I25.10 PLAQUE IN HEART ARTERY: Primary | ICD-10-CM

## 2018-05-30 DIAGNOSIS — Z72.0 TOBACCO ABUSE: ICD-10-CM

## 2018-05-30 PROCEDURE — G8427 DOCREV CUR MEDS BY ELIG CLIN: HCPCS | Performed by: FAMILY MEDICINE

## 2018-05-30 PROCEDURE — 1123F ACP DISCUSS/DSCN MKR DOCD: CPT | Performed by: FAMILY MEDICINE

## 2018-05-30 PROCEDURE — 1090F PRES/ABSN URINE INCON ASSESS: CPT | Performed by: FAMILY MEDICINE

## 2018-05-30 PROCEDURE — G8418 CALC BMI BLW LOW PARAM F/U: HCPCS | Performed by: FAMILY MEDICINE

## 2018-05-30 PROCEDURE — G8598 ASA/ANTIPLAT THER USED: HCPCS | Performed by: FAMILY MEDICINE

## 2018-05-30 PROCEDURE — G8400 PT W/DXA NO RESULTS DOC: HCPCS | Performed by: FAMILY MEDICINE

## 2018-05-30 PROCEDURE — 4040F PNEUMOC VAC/ADMIN/RCVD: CPT | Performed by: FAMILY MEDICINE

## 2018-05-30 PROCEDURE — 99213 OFFICE O/P EST LOW 20 MIN: CPT | Performed by: FAMILY MEDICINE

## 2018-05-30 PROCEDURE — 4004F PT TOBACCO SCREEN RCVD TLK: CPT | Performed by: FAMILY MEDICINE

## 2018-05-30 ASSESSMENT — ENCOUNTER SYMPTOMS
COLOR CHANGE: 0
NAUSEA: 0
EYE DISCHARGE: 0
EYE PAIN: 0
BACK PAIN: 1
APNEA: 0
EYE REDNESS: 0
CHOKING: 0
CHEST TIGHTNESS: 0
ABDOMINAL PAIN: 0
DIARRHEA: 0
PHOTOPHOBIA: 0
WHEEZING: 0
FACIAL SWELLING: 0
CONSTIPATION: 0
STRIDOR: 0
BOWEL INCONTINENCE: 0

## 2018-05-31 ENCOUNTER — TELEPHONE (OUTPATIENT)
Dept: PRIMARY CARE CLINIC | Age: 78
End: 2018-05-31

## 2018-08-06 ENCOUNTER — OFFICE VISIT (OUTPATIENT)
Dept: PRIMARY CARE CLINIC | Age: 78
End: 2018-08-06
Payer: MEDICARE

## 2018-08-06 VITALS
WEIGHT: 99 LBS | SYSTOLIC BLOOD PRESSURE: 130 MMHG | BODY MASS INDEX: 16.9 KG/M2 | HEART RATE: 53 BPM | RESPIRATION RATE: 14 BRPM | TEMPERATURE: 97.9 F | DIASTOLIC BLOOD PRESSURE: 88 MMHG | HEIGHT: 64 IN

## 2018-08-06 DIAGNOSIS — J44.9 CHRONIC OBSTRUCTIVE PULMONARY DISEASE, UNSPECIFIED COPD TYPE (HCC): ICD-10-CM

## 2018-08-06 DIAGNOSIS — M54.41 ACUTE RIGHT-SIDED LOW BACK PAIN WITH RIGHT-SIDED SCIATICA: Primary | ICD-10-CM

## 2018-08-06 PROCEDURE — 4040F PNEUMOC VAC/ADMIN/RCVD: CPT | Performed by: FAMILY MEDICINE

## 2018-08-06 PROCEDURE — 96372 THER/PROPH/DIAG INJ SC/IM: CPT | Performed by: FAMILY MEDICINE

## 2018-08-06 PROCEDURE — G8419 CALC BMI OUT NRM PARAM NOF/U: HCPCS | Performed by: FAMILY MEDICINE

## 2018-08-06 PROCEDURE — G8427 DOCREV CUR MEDS BY ELIG CLIN: HCPCS | Performed by: FAMILY MEDICINE

## 2018-08-06 PROCEDURE — 99213 OFFICE O/P EST LOW 20 MIN: CPT | Performed by: FAMILY MEDICINE

## 2018-08-06 PROCEDURE — G8598 ASA/ANTIPLAT THER USED: HCPCS | Performed by: FAMILY MEDICINE

## 2018-08-06 PROCEDURE — G8926 SPIRO NO PERF OR DOC: HCPCS | Performed by: FAMILY MEDICINE

## 2018-08-06 PROCEDURE — 1123F ACP DISCUSS/DSCN MKR DOCD: CPT | Performed by: FAMILY MEDICINE

## 2018-08-06 PROCEDURE — 1090F PRES/ABSN URINE INCON ASSESS: CPT | Performed by: FAMILY MEDICINE

## 2018-08-06 PROCEDURE — 3023F SPIROM DOC REV: CPT | Performed by: FAMILY MEDICINE

## 2018-08-06 PROCEDURE — 4004F PT TOBACCO SCREEN RCVD TLK: CPT | Performed by: FAMILY MEDICINE

## 2018-08-06 PROCEDURE — G8400 PT W/DXA NO RESULTS DOC: HCPCS | Performed by: FAMILY MEDICINE

## 2018-08-06 PROCEDURE — 1101F PT FALLS ASSESS-DOCD LE1/YR: CPT | Performed by: FAMILY MEDICINE

## 2018-08-06 RX ORDER — KETOROLAC TROMETHAMINE 30 MG/ML
60 INJECTION, SOLUTION INTRAMUSCULAR; INTRAVENOUS ONCE
Status: COMPLETED | OUTPATIENT
Start: 2018-08-06 | End: 2018-08-06

## 2018-08-06 RX ORDER — PREDNISONE 10 MG/1
TABLET ORAL
Qty: 20 TABLET | Refills: 0 | Status: SHIPPED | OUTPATIENT
Start: 2018-08-06 | End: 2018-08-16

## 2018-08-06 RX ORDER — KETOROLAC TROMETHAMINE 10 MG/1
10 TABLET, FILM COATED ORAL EVERY 6 HOURS PRN
Qty: 20 TABLET | Refills: 0 | Status: SHIPPED | OUTPATIENT
Start: 2018-08-06 | End: 2018-09-20 | Stop reason: ALTCHOICE

## 2018-08-06 RX ORDER — CYCLOBENZAPRINE HCL 10 MG
10 TABLET ORAL 3 TIMES DAILY PRN
Qty: 60 TABLET | Refills: 1 | Status: SHIPPED | OUTPATIENT
Start: 2018-08-06 | End: 2018-08-16

## 2018-08-06 RX ADMIN — KETOROLAC TROMETHAMINE 60 MG: 30 INJECTION, SOLUTION INTRAMUSCULAR; INTRAVENOUS at 17:31

## 2018-08-06 ASSESSMENT — ENCOUNTER SYMPTOMS
FACIAL SWELLING: 0
CHEST TIGHTNESS: 0
EYE REDNESS: 0
BACK PAIN: 1
NAUSEA: 0
EYE PAIN: 0
EYE DISCHARGE: 0
STRIDOR: 0
APNEA: 0
CONSTIPATION: 0
WHEEZING: 0
ABDOMINAL PAIN: 0
CHOKING: 0
BOWEL INCONTINENCE: 0
DIARRHEA: 0
PHOTOPHOBIA: 0
COLOR CHANGE: 0

## 2018-08-06 NOTE — PROGRESS NOTES
presence of Gomez Coburn DO. Electronically signed by :  SERENITY Chacko    I, Gomez Coburn DO, personally performed the services described in this documentation, as scribed by SERENITY Chacko   in my presence, and it is both accurate and complete.  Electronically signed by: Gomez Coburn DO    8/6/18 5:23 PM    Gomez Coburn DO

## 2018-09-20 ENCOUNTER — OFFICE VISIT (OUTPATIENT)
Dept: PRIMARY CARE CLINIC | Age: 78
End: 2018-09-20
Payer: MEDICARE

## 2018-09-20 VITALS
SYSTOLIC BLOOD PRESSURE: 110 MMHG | HEART RATE: 60 BPM | BODY MASS INDEX: 16.39 KG/M2 | HEIGHT: 64 IN | WEIGHT: 96 LBS | TEMPERATURE: 97.7 F | RESPIRATION RATE: 12 BRPM | DIASTOLIC BLOOD PRESSURE: 76 MMHG

## 2018-09-20 DIAGNOSIS — R09.89 RHONCHI: ICD-10-CM

## 2018-09-20 DIAGNOSIS — R29.898 BILATERAL LEG WEAKNESS: ICD-10-CM

## 2018-09-20 DIAGNOSIS — I48.0 PAF (PAROXYSMAL ATRIAL FIBRILLATION) (HCC): ICD-10-CM

## 2018-09-20 DIAGNOSIS — R06.09 DYSPNEA ON EXERTION: Primary | ICD-10-CM

## 2018-09-20 DIAGNOSIS — R53.1 EPISODIC WEAKNESS: ICD-10-CM

## 2018-09-20 DIAGNOSIS — J44.9 CHRONIC OBSTRUCTIVE PULMONARY DISEASE, UNSPECIFIED COPD TYPE (HCC): ICD-10-CM

## 2018-09-20 DIAGNOSIS — J40 BRONCHITIS: ICD-10-CM

## 2018-09-20 DIAGNOSIS — R94.31 ABNORMAL EKG: ICD-10-CM

## 2018-09-20 DIAGNOSIS — R32 URINARY INCONTINENCE, UNSPECIFIED TYPE: ICD-10-CM

## 2018-09-20 DIAGNOSIS — Z72.0 TOBACCO ABUSE: ICD-10-CM

## 2018-09-20 PROCEDURE — 1123F ACP DISCUSS/DSCN MKR DOCD: CPT | Performed by: FAMILY MEDICINE

## 2018-09-20 PROCEDURE — 93000 ELECTROCARDIOGRAM COMPLETE: CPT | Performed by: FAMILY MEDICINE

## 2018-09-20 PROCEDURE — G8926 SPIRO NO PERF OR DOC: HCPCS | Performed by: FAMILY MEDICINE

## 2018-09-20 PROCEDURE — G8598 ASA/ANTIPLAT THER USED: HCPCS | Performed by: FAMILY MEDICINE

## 2018-09-20 PROCEDURE — 1101F PT FALLS ASSESS-DOCD LE1/YR: CPT | Performed by: FAMILY MEDICINE

## 2018-09-20 PROCEDURE — G8400 PT W/DXA NO RESULTS DOC: HCPCS | Performed by: FAMILY MEDICINE

## 2018-09-20 PROCEDURE — 3023F SPIROM DOC REV: CPT | Performed by: FAMILY MEDICINE

## 2018-09-20 PROCEDURE — 4004F PT TOBACCO SCREEN RCVD TLK: CPT | Performed by: FAMILY MEDICINE

## 2018-09-20 PROCEDURE — 1090F PRES/ABSN URINE INCON ASSESS: CPT | Performed by: FAMILY MEDICINE

## 2018-09-20 PROCEDURE — 4040F PNEUMOC VAC/ADMIN/RCVD: CPT | Performed by: FAMILY MEDICINE

## 2018-09-20 PROCEDURE — G8419 CALC BMI OUT NRM PARAM NOF/U: HCPCS | Performed by: FAMILY MEDICINE

## 2018-09-20 PROCEDURE — 0509F URINE INCON PLAN DOCD: CPT | Performed by: FAMILY MEDICINE

## 2018-09-20 PROCEDURE — G8427 DOCREV CUR MEDS BY ELIG CLIN: HCPCS | Performed by: FAMILY MEDICINE

## 2018-09-20 PROCEDURE — 99214 OFFICE O/P EST MOD 30 MIN: CPT | Performed by: FAMILY MEDICINE

## 2018-09-20 RX ORDER — NITROGLYCERIN 0.3 MG/1
0.3 TABLET SUBLINGUAL EVERY 5 MIN PRN
Qty: 30 TABLET | Refills: 3 | Status: SHIPPED | OUTPATIENT
Start: 2018-09-20

## 2018-09-20 RX ORDER — AZITHROMYCIN 250 MG/1
TABLET, FILM COATED ORAL
Qty: 1 PACKET | Refills: 0 | Status: SHIPPED | OUTPATIENT
Start: 2018-09-20 | End: 2018-09-24

## 2018-09-20 ASSESSMENT — ENCOUNTER SYMPTOMS
CONSTIPATION: 0
CHEST TIGHTNESS: 0
ABDOMINAL PAIN: 1
DIARRHEA: 0
EYE REDNESS: 0
WHEEZING: 0
SORE THROAT: 0
SPUTUM PRODUCTION: 0
NAUSEA: 0
STRIDOR: 0
COLOR CHANGE: 0
VOMITING: 0
RHINORRHEA: 0
ORTHOPNEA: 0
SHORTNESS OF BREATH: 1
PHOTOPHOBIA: 0
APNEA: 0
EYE DISCHARGE: 0
SWOLLEN GLANDS: 0
EYE PAIN: 0
CHOKING: 0
FACIAL SWELLING: 0
HEMOPTYSIS: 0

## 2018-09-20 NOTE — PROGRESS NOTES
Subjective:      Patient ID: Urvashi Portillo is a [de-identified] y.o. female who presents today for:  Chief Complaint   Patient presents with    Shortness of Breath     episodes of SOB with exertion x several months. She states she would then be diapheretic and her knees would be very weak and she has urinary incontinence. She states these episodes happen daily and she is able to predict when they will happen and they pass quickly after sitting and deep breathing. She has not had any times where she has fallen during these episodes. Shortness of Breath   This is a recurrent problem. The current episode started more than 1 month ago. The problem occurs daily. The problem has been unchanged. Associated symptoms include abdominal pain. Pertinent negatives include no chest pain, claudication, coryza, ear pain, fever, headaches, hemoptysis, leg pain, leg swelling, neck pain, orthopnea, PND, rash, rhinorrhea, sore throat, sputum production, swollen glands, syncope, vomiting or wheezing. The symptoms are aggravated by any activity and exercise. She has tried nothing for the symptoms. Her past medical history is significant for COPD. States that she hasn't smoked in 6 days.     Past Medical History:   Diagnosis Date    ACE inhibitor-aggravated angioedema 11/1/2016    Breast CA , B, w/ localized mets, resolved 2/5/2015    Cancer [facility])     Left Shoulder    Cerebrovascular accident (CVA) due to thrombosis of left cerebellar artery ([facility] [address] 9/20/2016    Chronic otitis externa of left ear 9/28/2015    COPD  2/5/2015    Dyspnea 3/21/2016    Irregular heart beat     Lymphadenopathy 8/1/2017    Lymphadenopathy, Benign by Bx 8/29/2017    Macular degeneration 2005    OA (osteoarthritis) 2/5/2015    PAC (premature atrial contraction) 8/1/2017    PAF (paroxysmal atrial fibrillation) ([facility] [address] 8/1/2017    Rib fractures     2 fractured ribs- right side     Shortness of breath     Thyroid disorder, prior 2/5/2015    Tobacco abuse 2015    Wrist fracture      Past Surgical History:   Procedure Laterality Date    ANTERIOR CRUCIATE LIGAMENT REPAIR Left Age 46    BLADDER SURGERY      FOOT SURGERY Bilateral , , 2012    [age]STERECTOM[age]      Age 41.9    MASTECTOMY Bilateral     Age 37     Family History   Problem Relation Age of Onset    Heart Disease Father     Asthma Sister     Diabetes Sister     Heart Disease Brother     Diabetes Brother     Cancer Sister         Breast     Social History     Social History    Marital status:      Spouse name: N/A    Number of children: N/A    Years of education: N/A     Occupational History    Not on file. Social History Main Topics    Smoking status: Current Every Day Smoker     Packs/day: 0.50     Years: 1.00     Types: Cigarettes    Smokeless tobacco: Never Used      Comment: She quit in , her   in 2014 and since then she has started smoking again.  Alcohol use 0.0 oz/week      Comment: Rare    Drug use: Unknown    Sexual activity: Not on file     Other Topics Concern    Not on file     Social History Narrative    No narrative on file     Allergies:  Lisinopril and Penicillins    Review of Systems   Constitutional: Negative for activity change, appetite change, chills, diaphoresis and fever. HENT: Negative for congestion, ear discharge, ear pain, facial swelling, hearing loss, mouth sores, rhinorrhea and sore throat. Eyes: Negative for photophobia, pain, discharge and redness. Respiratory: Positive for shortness of breath. Negative for apnea, hemoptysis, sputum production, choking, chest tightness, wheezing and stridor. Cardiovascular: Negative for chest pain, palpitations, orthopnea, claudication, leg swelling, syncope and PND. Gastrointestinal: Positive for abdominal pain. Negative for constipation, diarrhea, nausea and vomiting. Endocrine: Negative for cold intolerance, heat intolerance, polydipsia and polyphagia.

## 2018-09-27 ENCOUNTER — OFFICE VISIT (OUTPATIENT)
Dept: CARDIOLOGY CLINIC | Age: 78
End: 2018-09-27
Payer: MEDICARE

## 2018-09-27 VITALS
WEIGHT: 97 LBS | DIASTOLIC BLOOD PRESSURE: 92 MMHG | BODY MASS INDEX: 16.56 KG/M2 | SYSTOLIC BLOOD PRESSURE: 126 MMHG | HEIGHT: 64 IN | RESPIRATION RATE: 14 BRPM

## 2018-09-27 DIAGNOSIS — I49.01 VENTRICULAR FIBRILLATION (HCC): ICD-10-CM

## 2018-09-27 DIAGNOSIS — R06.02 SOB (SHORTNESS OF BREATH): ICD-10-CM

## 2018-09-27 DIAGNOSIS — J44.9 CHRONIC OBSTRUCTIVE PULMONARY DISEASE, UNSPECIFIED COPD TYPE (HCC): ICD-10-CM

## 2018-09-27 DIAGNOSIS — R07.89 OTHER CHEST PAIN: Primary | ICD-10-CM

## 2018-09-27 PROCEDURE — 1090F PRES/ABSN URINE INCON ASSESS: CPT | Performed by: INTERNAL MEDICINE

## 2018-09-27 PROCEDURE — G8419 CALC BMI OUT NRM PARAM NOF/U: HCPCS | Performed by: INTERNAL MEDICINE

## 2018-09-27 PROCEDURE — 1101F PT FALLS ASSESS-DOCD LE1/YR: CPT | Performed by: INTERNAL MEDICINE

## 2018-09-27 PROCEDURE — 99204 OFFICE O/P NEW MOD 45 MIN: CPT | Performed by: INTERNAL MEDICINE

## 2018-09-27 PROCEDURE — G8427 DOCREV CUR MEDS BY ELIG CLIN: HCPCS | Performed by: INTERNAL MEDICINE

## 2018-09-27 PROCEDURE — 3023F SPIROM DOC REV: CPT | Performed by: INTERNAL MEDICINE

## 2018-09-27 PROCEDURE — G8926 SPIRO NO PERF OR DOC: HCPCS | Performed by: INTERNAL MEDICINE

## 2018-09-27 RX ORDER — METOPROLOL SUCCINATE 25 MG/1
25 TABLET, EXTENDED RELEASE ORAL DAILY
Qty: 30 TABLET | Refills: 3 | Status: SHIPPED | OUTPATIENT
Start: 2018-09-27 | End: 2019-02-05 | Stop reason: SDUPTHER

## 2018-09-27 ASSESSMENT — ENCOUNTER SYMPTOMS
TROUBLE SWALLOWING: 0
VOMITING: 0
VOICE CHANGE: 0
WHEEZING: 0
APNEA: 0
BLOOD IN STOOL: 0
FACIAL SWELLING: 0
SHORTNESS OF BREATH: 0
NAUSEA: 0
ABDOMINAL DISTENTION: 0
ANAL BLEEDING: 0
CHEST TIGHTNESS: 0
COLOR CHANGE: 0

## 2018-09-27 NOTE — PROGRESS NOTES
tablet by mouth daily 30 tablet 0    Aspirin Buf,AiJbs-RpHln-WvOjf, (BUFFERED ASPIRIN) 325 MG TABS Take 1 tablet by mouth daily 30 tablet 11    Multiple Vitamins-Minerals (EYE VITAMINS PO) Take 1 tablet by mouth daily      Multiple Vitamins-Minerals (CENTRUM ADULTS PO) Take 1 tablet by mouth daily. No current facility-administered medications for this visit. Review of Systems:   Review of Systems   Constitutional: Negative for activity change, appetite change, diaphoresis, fatigue and unexpected weight change. HENT: Negative for facial swelling, nosebleeds, trouble swallowing and voice change. Respiratory: Negative for apnea, chest tightness, shortness of breath and wheezing. Cardiovascular: Negative for chest pain, palpitations and leg swelling. Gastrointestinal: Negative for abdominal distention, anal bleeding, blood in stool, nausea and vomiting. Genitourinary: Negative for decreased urine volume and dysuria. Musculoskeletal: Negative for gait problem and myalgias. Skin: Negative for color change, pallor, rash and wound. Neurological: Negative for dizziness, syncope, facial asymmetry, weakness, light-headedness, numbness and headaches. Hematological: Does not bruise/bleed easily. Psychiatric/Behavioral: Negative for agitation, behavioral problems, confusion, hallucinations and suicidal ideas. The patient is not nervous/anxious. All other systems reviewed and are negative. Review of System is negative except for as mentioned above. Physical Examination:    BP (!) 126/92   Resp 14   Ht 5' 4\" (1.626 m)   Wt 97 lb (44 kg)   BMI 16.65 kg/m²    Physical Exam   Constitutional: She appears healthy. No distress. HENT:   Nose: Nose normal.   Mouth/Throat: Dentition is normal. Oropharynx is clear. Eyes: Pupils are equal, round, and reactive to light. Conjunctivae are normal.   Neck: Normal range of motion and thyroid normal. Neck supple.    Cardiovascular: Regular

## 2018-10-01 ENCOUNTER — TELEPHONE (OUTPATIENT)
Dept: PRIMARY CARE CLINIC | Age: 78
End: 2018-10-01

## 2018-10-22 ENCOUNTER — TELEPHONE (OUTPATIENT)
Dept: CARDIOLOGY CLINIC | Age: 78
End: 2018-10-22

## 2018-10-31 PROBLEM — I49.3 PVCS (PREMATURE VENTRICULAR CONTRACTIONS): Status: ACTIVE | Noted: 2018-10-31

## 2018-11-01 ENCOUNTER — OFFICE VISIT (OUTPATIENT)
Dept: CARDIOLOGY CLINIC | Age: 78
End: 2018-11-01
Payer: MEDICARE

## 2018-11-01 VITALS
TEMPERATURE: 97.2 F | RESPIRATION RATE: 18 BRPM | DIASTOLIC BLOOD PRESSURE: 82 MMHG | BODY MASS INDEX: 17.5 KG/M2 | SYSTOLIC BLOOD PRESSURE: 118 MMHG | HEART RATE: 77 BPM | OXYGEN SATURATION: 99 % | HEIGHT: 64 IN | WEIGHT: 102.5 LBS

## 2018-11-01 DIAGNOSIS — R07.89 OTHER CHEST PAIN: Primary | ICD-10-CM

## 2018-11-01 DIAGNOSIS — I48.0 PAF (PAROXYSMAL ATRIAL FIBRILLATION) (HCC): ICD-10-CM

## 2018-11-01 DIAGNOSIS — I49.3 PVCS (PREMATURE VENTRICULAR CONTRACTIONS): ICD-10-CM

## 2018-11-01 PROCEDURE — 1101F PT FALLS ASSESS-DOCD LE1/YR: CPT | Performed by: INTERNAL MEDICINE

## 2018-11-01 PROCEDURE — G8598 ASA/ANTIPLAT THER USED: HCPCS | Performed by: INTERNAL MEDICINE

## 2018-11-01 PROCEDURE — G8427 DOCREV CUR MEDS BY ELIG CLIN: HCPCS | Performed by: INTERNAL MEDICINE

## 2018-11-01 PROCEDURE — G8400 PT W/DXA NO RESULTS DOC: HCPCS | Performed by: INTERNAL MEDICINE

## 2018-11-01 PROCEDURE — G8419 CALC BMI OUT NRM PARAM NOF/U: HCPCS | Performed by: INTERNAL MEDICINE

## 2018-11-01 PROCEDURE — G8484 FLU IMMUNIZE NO ADMIN: HCPCS | Performed by: INTERNAL MEDICINE

## 2018-11-01 PROCEDURE — 99213 OFFICE O/P EST LOW 20 MIN: CPT | Performed by: INTERNAL MEDICINE

## 2018-11-01 PROCEDURE — 1123F ACP DISCUSS/DSCN MKR DOCD: CPT | Performed by: INTERNAL MEDICINE

## 2018-11-01 PROCEDURE — 4004F PT TOBACCO SCREEN RCVD TLK: CPT | Performed by: INTERNAL MEDICINE

## 2018-11-01 PROCEDURE — 1090F PRES/ABSN URINE INCON ASSESS: CPT | Performed by: INTERNAL MEDICINE

## 2018-11-01 PROCEDURE — 4040F PNEUMOC VAC/ADMIN/RCVD: CPT | Performed by: INTERNAL MEDICINE

## 2018-11-01 ASSESSMENT — ENCOUNTER SYMPTOMS
DIARRHEA: 0
CHEST TIGHTNESS: 0
BLOOD IN STOOL: 0
VOMITING: 0
COLOR CHANGE: 0
SHORTNESS OF BREATH: 0
APNEA: 0
NAUSEA: 0

## 2018-11-01 NOTE — PROGRESS NOTES
[facility] CARDIOLOGY OFFICE FOLLOW-UP      Patient: Linda Davis  YOB: 1940  MRN: [id number]    Chief Complaint:  Chief Complaint   Patient presents with    Results     Echo and Stress completed - No Holter testing done at this time    Chest Pain    Medication Check     Toprol 25MG         Subjective/HPI:  18: Patient presents today for Abnormal EKG. Dr. Cleary No primary. Stress test and echo were both unremarkable. There is grade 1 LV diastolic dysfunction. Accompanied by her daughter. Tried to reassure her. See me in one year. 18: Patient presents today for  Evaluation of abnormal EKG. Accompanied by her daughter. She is [de-identified]years old. Has lost about 25/30 pounds since her 's death. Appetite is poor. Stopped smoking recently. 50 years of smoking. Then quit for 15 years and resumed again after her 's death also has a history of macular degeneration. Does not drive. She lives by herself. She is accompanied by her daughter. Her older daughter  of cancer about a year ago. She has a prior history of TIA or CVA and is on Plavix. Has a prior history of proximal atrial fibrillation. She has a history of angioedema to ACE inhibitor's. Was switched to hydrochlorothiazideDenies definite chest pain. Has palpitations. And shortness of breath which is multifactorial. No ankle edema. We'll start her on Toprol 25 mg a day. Lexiscan echo and a 24-hour Holter.  She had a stress test almost 10 years ago prior to her foot surgery by .               Past Medical History:   Diagnosis Date    ACE inhibitor-aggravated angioedema 2016    Breast CA , B, w/ localized mets, resolved 2015    Cancer [facility])     Left Shoulder    Cerebrovascular accident (CVA) due to thrombosis of left cerebellar artery ([facility] [address] 2016    Chronic otitis externa of left ear 2015    COPD  2015    Dyspnea 3/21/2016    Irregular heart beat     Lymphadenopathy 2017    dose, call 911. 30 tablet 3    clopidogrel (PLAVIX) 75 MG tablet Take 1 tablet by mouth daily 90 tablet 1    hydrochlorothiazide (HYDRODIURIL) 12.5 MG tablet EVERY DAY 90 tablet 1    atorvastatin (LIPITOR) 10 MG tablet Take 1 tablet by mouth daily 90 tablet 1    Misc Natural Products (GLUCOSAMINE CHOND COMPLEX/MSM) TABS Take 1 tablet by mouth daily 30 tablet 0    Aspirin Buf,AvXbs-VqDdl-DvDwl, (BUFFERED ASPIRIN) 325 MG TABS Take 1 tablet by mouth daily 30 tablet 11    Multiple Vitamins-Minerals (EYE VITAMINS PO) Take 1 tablet by mouth daily      Multiple Vitamins-Minerals (CENTRUM ADULTS PO) Take 1 tablet by mouth daily. No current facility-administered medications for this visit. Review of Systems:   Review of Systems   Constitutional: Negative for appetite change, diaphoresis and fatigue. HENT: Negative for nosebleeds. Respiratory: Negative for apnea, chest tightness and shortness of breath. Cardiovascular: Negative for chest pain, palpitations and leg swelling. Gastrointestinal: Negative for blood in stool, diarrhea, nausea and vomiting. Musculoskeletal: Negative for myalgias, neck pain and neck stiffness. Skin: Negative for color change, pallor, rash and wound. Neurological: Negative for dizziness, seizures, syncope, weakness, light-headedness, numbness and headaches. Hematological: Does not bruise/bleed easily. Psychiatric/Behavioral: Negative for agitation, behavioral problems and confusion. The patient is not nervous/anxious and is not hyperactive. All other systems reviewed and are negative. Review of System is negative except for as mentioned above. Physical Examination:    /82 (Site: Right Upper Arm, Position: Sitting, Cuff Size: Medium Adult)   Pulse 77   Temp 97.2 °F (36.2 °C) (Temporal)   Resp 18   Ht 5' 4\" (1.626 m)   Wt 102 lb 8 oz (46.5 kg)   SpO2 99%   BMI 17.59 kg/m²    Physical Exam   Constitutional: She appears healthy.    HENT: Nose: Nose normal.   Mouth/Throat: Dentition is normal. Oropharynx is clear. Eyes: Pupils are equal, round, and reactive to light. Neck: Normal range of motion. Cardiovascular: Normal rate, regular rhythm, S1 normal, S2 normal, normal heart sounds, intact distal pulses and normal pulses. No extrasystoles are present. Exam reveals no gallop. No murmur heard. Pulmonary/Chest: Effort normal and breath sounds normal. She has no wheezes. She has no rales. She exhibits no tenderness. Abdominal: Soft. Bowel sounds are normal. She exhibits no distension and no mass. There is no splenomegaly or hepatomegaly. There is no tenderness. Musculoskeletal: Normal range of motion. She exhibits no edema, tenderness or deformity. Neurological: She is alert and oriented to person, place, and time. She has normal motor skills and normal reflexes. Gait normal.   Skin: Skin is warm and dry.        LABS:  CBC:   Lab Results   Component Value Date    WBC 7.6 05/08/2018    RBC 4.70 05/08/2018    HGB 15.1 05/08/2018    HCT 44.8 05/08/2018    MCV 95.4 05/08/2018    MCH 32.1 05/08/2018    MCHC 33.6 05/08/2018    RDW 14.3 05/08/2018     05/08/2018     Lipids:  Lab Results   Component Value Date    CHOL 190 05/08/2018     Lab Results   Component Value Date    TRIG 62 05/08/2018     Lab Results   Component Value Date    HDL 76 (H) 05/08/2018     Lab Results   Component Value Date    LDLCALC 102 05/08/2018     No results found for: LABVLDL, VLDL  No results found for: CHOLHDLRATIO  CMP:    Lab Results   Component Value Date     05/08/2018    K 4.7 05/08/2018     05/08/2018    CO2 25 05/08/2018    BUN 21 05/08/2018    CREATININE 0.74 05/08/2018    GFRAA >60.0 05/08/2018    LABGLOM >60.0 05/08/2018    GLUCOSE 96 05/08/2018    PROT 7.0 05/08/2018    LABALBU 4.5 05/08/2018    CALCIUM 10.1 05/08/2018    BILITOT 0.4 05/08/2018    ALKPHOS 84 05/08/2018    AST 28 05/08/2018    ALT 23 05/08/2018     BMP:    Lab Results

## 2018-11-19 DIAGNOSIS — E78.5 DYSLIPIDEMIA: ICD-10-CM

## 2018-11-19 RX ORDER — ATORVASTATIN CALCIUM 10 MG/1
10 TABLET, FILM COATED ORAL DAILY
Qty: 90 TABLET | Refills: 3 | Status: SHIPPED | OUTPATIENT
Start: 2018-11-19

## 2018-11-19 RX ORDER — CLOPIDOGREL BISULFATE 75 MG/1
75 TABLET ORAL DAILY
Qty: 90 TABLET | Refills: 3 | Status: SHIPPED | OUTPATIENT
Start: 2018-11-19

## 2018-11-19 NOTE — TELEPHONE ENCOUNTER
Patient was last seen on 9/20/2018  Last Prescribed 5/8/2018    Medication is pending  Please approve or deny this request

## 2018-11-27 ENCOUNTER — CARE COORDINATION (OUTPATIENT)
Dept: CASE MANAGEMENT | Age: 78
End: 2018-11-27

## 2018-12-05 ENCOUNTER — CARE COORDINATION (OUTPATIENT)
Dept: CARE COORDINATION | Age: 78
End: 2018-12-05

## 2018-12-13 ENCOUNTER — CARE COORDINATION (OUTPATIENT)
Dept: CARE COORDINATION | Age: 78
End: 2018-12-13

## 2019-01-21 RX ORDER — HYDROCHLOROTHIAZIDE 12.5 MG/1
TABLET ORAL
Qty: 90 TABLET | Refills: 1 | Status: SHIPPED | OUTPATIENT
Start: 2019-01-21

## 2019-06-10 DIAGNOSIS — I49.01 VENTRICULAR FIBRILLATION (HCC): ICD-10-CM

## 2019-06-10 DIAGNOSIS — R06.02 SOB (SHORTNESS OF BREATH): ICD-10-CM

## 2019-06-10 DIAGNOSIS — R07.89 OTHER CHEST PAIN: ICD-10-CM

## 2020-01-06 RX ORDER — METOPROLOL SUCCINATE 25 MG/1
25 TABLET, EXTENDED RELEASE ORAL DAILY
Qty: 90 TABLET | Refills: 3 | OUTPATIENT
Start: 2020-01-06

## 2023-09-08 VITALS
HEART RATE: 109 BPM | DIASTOLIC BLOOD PRESSURE: 82 MMHG | OXYGEN SATURATION: 89 % | SYSTOLIC BLOOD PRESSURE: 172 MMHG | WEIGHT: 107.14 LBS | TEMPERATURE: 98.4 F | BODY MASS INDEX: 18.29 KG/M2 | HEIGHT: 64 IN